# Patient Record
Sex: MALE | Race: WHITE | NOT HISPANIC OR LATINO | URBAN - NONMETROPOLITAN AREA
[De-identification: names, ages, dates, MRNs, and addresses within clinical notes are randomized per-mention and may not be internally consistent; named-entity substitution may affect disease eponyms.]

---

## 2021-09-21 DIAGNOSIS — I86.1 VARICOCELE: Primary | ICD-10-CM

## 2021-10-06 ENCOUNTER — TELEPHONE (OUTPATIENT)
Dept: UROLOGY | Facility: CLINIC | Age: 51
End: 2021-10-06

## 2021-10-11 ENCOUNTER — OFFICE VISIT (OUTPATIENT)
Dept: UROLOGY | Facility: CLINIC | Age: 51
End: 2021-10-11

## 2021-10-11 VITALS — TEMPERATURE: 98 F | HEIGHT: 74 IN | BODY MASS INDEX: 40.43 KG/M2 | WEIGHT: 315 LBS

## 2021-10-11 DIAGNOSIS — N52.02 CORPORO-VENOUS OCCLUSIVE ERECTILE DYSFUNCTION: ICD-10-CM

## 2021-10-11 DIAGNOSIS — I86.1 LEFT VARICOCELE: Primary | ICD-10-CM

## 2021-10-11 PROCEDURE — 99203 OFFICE O/P NEW LOW 30 MIN: CPT | Performed by: UROLOGY

## 2021-10-11 RX ORDER — PIOGLITAZONEHYDROCHLORIDE 45 MG/1
TABLET ORAL
COMMUNITY
Start: 2021-09-28

## 2021-10-11 RX ORDER — METFORMIN HYDROCHLORIDE 500 MG/1
TABLET, EXTENDED RELEASE ORAL
COMMUNITY
Start: 2021-09-28

## 2021-10-11 RX ORDER — ROSUVASTATIN CALCIUM 40 MG/1
TABLET, COATED ORAL
COMMUNITY
Start: 2021-09-18

## 2021-10-11 RX ORDER — GLIMEPIRIDE 4 MG/1
TABLET ORAL
COMMUNITY
Start: 2021-09-13

## 2021-10-11 RX ORDER — TADALAFIL 5 MG/1
5 TABLET ORAL DAILY
Qty: 90 TABLET | Refills: 3 | Status: SHIPPED | OUTPATIENT
Start: 2021-10-11

## 2021-10-11 RX ORDER — TAMOXIFEN CITRATE 20 MG/1
TABLET ORAL
COMMUNITY
Start: 2021-09-13

## 2021-10-11 NOTE — PROGRESS NOTES
"Chief Complaint  Varicocele    Subjective          Ankit Schumacher presents to [facility] for:  History of Present Illness  Patient presents today with a left-sided varicocele.  This was first noted about 5 years ago.  Symptoms include occasional aching type pain.  No testicular mass.  The patient does not have an interest on effect in his fertility.    He is also bothered by erectile dysfunction.  He says he gets an erection but with any movement he loses it.  He says he has no difficulty achieving it but does have trouble maintaining it.        Current Outpatient Medications:   •  glimepiride (AMARYL) 4 MG tablet, , Disp: , Rfl:   •  metFORMIN ER (GLUCOPHAGE-XR) 500 MG 24 hr tablet, , Disp: , Rfl:   •  pioglitazone (ACTOS) 45 MG tablet, , Disp: , Rfl:   •  rosuvastatin (CRESTOR) 40 MG tablet, , Disp: , Rfl:   •  tamoxifen (NOLVADEX) 20 MG chemo tablet, , Disp: , Rfl:   •  tadalafil (CIALIS) 5 MG tablet, Take 1 tablet by mouth Daily., Disp: 90 tablet, Rfl: 3  Past Medical History:   Diagnosis Date   • Diabetes (HCC)      Past Surgical History:   Procedure Laterality Date   • CHOLECYSTECTOMY     • SHOULDER SURGERY Right    • TONSILLECTOMY           Review  of systems  Constitutional: Negative for chills and fever.   Gastrointestinal: Negative for abdominal pain, anal bleeding and blood in stool.   Genitourinary: Negative for flank pain and hematuria.      Objective   PHYSICAL EXAM  Vital Signs:   Temp 98 °F (36.7 °C)   Ht 188 cm (74\")   Wt (!) 151 kg (332 lb)   BMI 42.63 kg/m²     Constitutional: Patient is without distress or deformity.  Vital signs are reviewed as above.    Neuro: No confusion; No disorientation; Alert and oriented  Pulmonary: No respiratory distress.   Skin: No pallor or diaphoresis  GI: Abdomen is soft and nontender.  No significant distention.  No hernias noted.  : He has a left grade 3 varicocele present.  Testicle is normal in size, shape and there is no " evidence of mass.  Right testis and cord are normal.    DATA  Result Review :                  ASSESSMENT AND PLAN      Problem List Items Addressed This Visit     None      Visit Diagnoses     Left varicocele    -  Primary    Corporo-venous occlusive erectile dysfunction        Relevant Medications    tadalafil (CIALIS) 5 MG tablet        -Patient has a left varicocele with no evidence of mass or testicular atrophy.  I think this can be safely followed.    -Patient does have issues with erectile dysfunction.  We discussed pros and cons of medications.  I recommended tadalafil 5 mg daily      FOLLOW UP     Return if symptoms worsen or fail to improve.        (Please note that portions of this note were completed with a voice recognition program.)  Chris Sunshine MD  10/11/21  13:13 CDT